# Patient Record
(demographics unavailable — no encounter records)

---

## 2024-12-02 NOTE — PHYSICAL EXAM
[General Appearance - Well Developed] : well developed [Normal Appearance] : normal appearance [Well Groomed] : well groomed [General Appearance - Well Nourished] : well nourished [No Deformities] : no deformities [General Appearance - In No Acute Distress] : no acute distress [Normal Conjunctiva] : the conjunctiva exhibited no abnormalities [Eyelids - No Xanthelasma] : the eyelids demonstrated no xanthelasmas [Respiration, Rhythm And Depth] : normal respiratory rhythm and effort [Exaggerated Use Of Accessory Muscles For Inspiration] : no accessory muscle use [Auscultation Breath Sounds / Voice Sounds] : lungs were clear to auscultation bilaterally [Heart Rate And Rhythm] : heart rate and rhythm were normal [Heart Sounds] : normal S1 and S2 [Murmurs] : no murmurs present [Abdomen Soft] : soft [Abdomen Tenderness] : non-tender [Abdomen Mass (___ Cm)] : no abdominal mass palpated [Abnormal Walk] : normal gait [Gait - Sufficient For Exercise Testing] : the gait was sufficient for exercise testing [Nail Clubbing] : no clubbing of the fingernails [Cyanosis, Localized] : no localized cyanosis [Petechial Hemorrhages (___cm)] : no petechial hemorrhages [Skin Color & Pigmentation] : normal skin color and pigmentation [] : no rash [No Venous Stasis] : no venous stasis [Skin Lesions] : no skin lesions [No Skin Ulcers] : no skin ulcer [No Xanthoma] : no  xanthoma was observed [Oriented To Time, Place, And Person] : oriented to person, place, and time [Affect] : the affect was normal [Mood] : the mood was normal [No Anxiety] : not feeling anxious [FreeTextEntry1] : mild  LE foot edema

## 2024-12-02 NOTE — HISTORY OF PRESENT ILLNESS
[FreeTextEntry1] : PCP- Dr EZRA Gonzalez\par  Pulm- Dr CASH Colvin\par  Endo- L Poretsky\par  Renal-  Dr Avalos\par  Pain- @ [place]

## 2024-12-02 NOTE — REASON FOR VISIT
[FreeTextEntry1] : 85 yr old male with COPD, GERD, HTN, DM, CKD and hypothyroidism. Presents today for Cardiology  consult.  Review of his records reveals the presence of a thoracic aneurysm documented at 45 mm in 2013 and 48 mm in 2017.  These were C- CTs.  Pt denies any c/o.  8/10/20 Bothered by neck pain x 5 days and then left shoulder. No CP,better if he moves it but it comes back. Feels fine with walking.  4/1/21- Hearing loss. BP OK, walks with his wife daily 12/14/21  Muscle ache right calf with walking, pulses excellent.  ? statin induced.   ESCOTO, will get CCS which will give a CCS, lung view and aortic size. 9/15/22 Had Shingles L trigeminal  distribution  On Gabapentin and Tramadol.  Aorta assess early 2022 by CT 3/14/23  In [facility] 2/28-3/7/23 with sepsis, Empyema and A flutter. In [facility] 2/24-2/26/23 with PNA.  Pleural effusion grew Pneumococcus 4/25/23 no complaints today. checks BP at home -BPs up to 160 systolic. having lot of leg pain with crestor, stopped taking.   6/22/23  Off Hydralazine, BP excellent. 11/29/23  Feels weak since long hosp stay last year 6/3/24  A1c 6.7, , , HDL 37,  EKG: NSR with 1st degree AVB and a RBBB. No ST-Tw abnormalities. 7/15/20, 4/1/21, 3/14/23 12/2/24: Wife says his memory never recovered from the sepsis.  c/o fatigue/weakness.

## 2024-12-02 NOTE — ASSESSMENT
[FreeTextEntry1] : 4/25/23- BP elevated at home and in office. start hydralazine 10mg TID. f/u in 1 mo.  BP better 12/2 . Now on Felodipine and HCTZ.  /60.  HLD -4/25/23 -needs to restart statin -will try every other dosing at next appointment . He refuses all options including Pravastatin.  Not clear if he is taking Pravastatin, last LDL too high at 146, will consider Repatha. Spoke to wife, pt not taking statin.  Will resume and stress compliance.  A Flutter- On Eliquis 2.5 BID.  Edema- Increased since hospital stay. Change HCTZ to Lasix 20 mg. Still on HCTZ. Edema better. 12/2 - no edema.  RBBB and 1st degree AVB- will watch for worsening conduction abnormalities. New since Feb 2020. Needs Nuc to r/o CAD. WNL 2020  Dilated Ao root- Hx of a thoracic aneurysm documented at 45 mm in 2013 and 48 mm in 2017. These were C- CTs. On echo Aug 2020 aortic root width is 44 mm which is most likely underestimating it. Last CT 2017, reordered. 42-45 mm in 2022. 50 mm on CT Feb 2023 in [facility].  Referred to Dr Guerra.  Sinus Bradycardia- minimum HR of 31 noted on ZIO monitor. Pt is not on any sinus slowing meds, however Clonidine can slow HR. DC Clonidine 3/2023, already stopped in hospital.  HR better.  ESCOTO- CCS ordered but not done.

## 2025-01-28 NOTE — PHYSICAL EXAM
[Alert] : alert [Well Nourished] : well nourished [Healthy Appearance] : healthy appearance [No Acute Distress] : no acute distress [Normal Sclera/Conjunctiva] : normal sclera/conjunctiva [No Proptosis] : no proptosis [No Respiratory Distress] : no respiratory distress [No Accessory Muscle Use] : no accessory muscle use [Normal Rate and Effort] : normal respiratory rate and effort [Normal Rate] : heart rate was normal [No Edema] : no peripheral edema [Spine Straight] : spine straight [No Stigmata of Cushings Syndrome] : no stigmata of Cushings Syndrome [Normal Gait] : normal gait [No Clubbing, Cyanosis] : no clubbing  or cyanosis of the fingernails [No Involuntary Movements] : no involuntary movements were seen [No Joint Swelling] : no joint swelling seen [No Rash] : no rash [No Tremors] : no tremors [Oriented x3] : oriented to person, place, and time [Acanthosis Nigricans] : no acanthosis nigricans [Foot Ulcers] : no foot ulcers [de-identified] : poor hearing

## 2025-01-28 NOTE — ASSESSMENT
[Diabetes Foot Care] : diabetes foot care [Carbohydrate Consistent Diet] : carbohydrate consistent diet [Self Monitoring of Blood Glucose] : self monitoring of blood glucose [Retinopathy Screening] : Patient was referred to ophthalmology for retinopathy screening [FreeTextEntry1] : Diabetes - well controlled, continue the same Tx. Questions about GLP-1 answered. Hypothyroidism - will f/u once labs are available. Refills sent. Referred to ophthalmologist. F/U with Dr. Salamanca in 4-6 months.

## 2025-01-28 NOTE — HISTORY OF PRESENT ILLNESS
[FreeTextEntry1] : Pt of Dr. Salamanca with DM and hypothyroidism came for f/u. Feels well. Current Tx for DM - Mounjaro 2.5; keeps BG under control Whwn checks at home - FS -  in 130-s; POC BG - 113 mg/dl POC A1C - 6.5%. Lost ~ 5 lbs and is happy with his current weight. Recent labs from Dr. Barron reviewed and discussed; I called the lab, asking to add TFTs, if not possible - will order via [facility].  Ophtho and podiatry - due.

## 2025-01-31 NOTE — PHYSICAL EXAM
[General Appearance - Alert] : alert [General Appearance - In No Acute Distress] : in no acute distress [] : no respiratory distress [Heart Rate And Rhythm] : heart rate was normal and rhythm regular [Auscultation Breath Sounds / Voice Sounds] : lungs were clear to auscultation bilaterally [Heart Sounds] : normal S1 and S2 [Heart Sounds Gallop] : no gallops [Murmurs] : no murmurs [Heart Sounds Pericardial Friction Rub] : no pericardial rub [Oriented To Time, Place, And Person] : oriented to person, place, and time [Impaired Insight] : insight and judgment were intact [Affect] : the affect was normal [FreeTextEntry1] : b/l trace pedeal edema

## 2025-01-31 NOTE — PHYSICAL EXAM
[General Appearance - Alert] : alert [General Appearance - In No Acute Distress] : in no acute distress [] : no respiratory distress [Auscultation Breath Sounds / Voice Sounds] : lungs were clear to auscultation bilaterally [Heart Rate And Rhythm] : heart rate was normal and rhythm regular [Heart Sounds] : normal S1 and S2 [Heart Sounds Gallop] : no gallops [Murmurs] : no murmurs [Heart Sounds Pericardial Friction Rub] : no pericardial rub [Oriented To Time, Place, And Person] : oriented to person, place, and time [Impaired Insight] : insight and judgment were intact [Affect] : the affect was normal [FreeTextEntry1] : b/l trace pedeal edema

## 2025-01-31 NOTE — HISTORY OF PRESENT ILLNESS
[FreeTextEntry1] : 86 yo man with CKD 4, proteinuria, DM, and HTN, for follow up evaluation no new medical events medications same energy appetite okay Fells well overall No NSAID  use. No HA, CP, SOB, dizziness. No flank pain, dysuria, hematuria, frothy urine.

## 2025-01-31 NOTE — ASSESSMENT
[FreeTextEntry1] : all lab data was reviewed with patient in detail from  1/22/2025 and 1/25/2025 86 yo man with CKD 4, hyperkalemia, HTN, DM, proteinuria -CKD 4: clinically stable- BP, volume status good creat stable range- 2.94  maintains low protein diet -HTN: BP at goal- c/w present regimen   -DM: A1C acceptable 6.7% -metabolic acidosis- CO2- 17- if stays < 20 will add bicarb -Proteinuria: Ualb/creat down again to 805 from 996 defer RASi as pt with allergy to CEI, pt reluctant to start any other medications not sure if addition of [id number] will help his outcome given advanced age and advanced CKD -hyperlipidemia- LP improving- c/w same  f/u 4 months

## 2025-03-01 NOTE — HISTORY OF PRESENT ILLNESS
[Former] : former [>= 20 pack years] : >= 20 pack years [Never] : never [TextBox_4] :  87 yom former smoker with history of empyema in 2023, COPD (only Advair 2 puffs/day), thoracic aneurysm, HTN, DM, hypothyroidism and hyperlipidemia. Former patient of Dr. Colvin.  Diagnosed with empyema in March 2023,  s/p chest tube and MIST2 protocol  (hospitalized [facility] 02/27-03/07), recovered. Right bronchogenic cyst known for years(?), appreciated on CT.  Pt reports no cough, able to bring out clear phlegm. Wife reports he is able to ambulate 8 blocks every day to diner, no stairs. Appetite has not changed, main complaint is fatigue. Otherwise in the same state of health. Pt denies any recent sickness, chest pain, SOB, nausea, vomiting, dizziness. and recent hospitalization. He takes advair, 2puffs BID reports compliance with inhaler. Past PFT: 1/31/2022: FEV1/FVC:68, FEV1:74%, DLCO:60%  Social hx: retired  Covid vaccine: bivalent booster 2023 RSV vaccine: 09/23  Flu vaccine: 2023 04/04/24 Pt is a follow up after PFT accompanied by wife. Pt remains in the same state of health. Still ambulates 8 blocks daily to the diner. Reports occasional coughs out white sputum. Pt denies weight loss, chest pain, SOB, nausea, and vomiting .  PFT: 01/11/24:  FEV1/FVC:60%<-- 68% FEV1:83%(1.85L)<-- 74%(2.01L) Unable to do lung volumes DLCO:61% (12.56 ml/min/mmHg)<--60%(14.69ml/min/mmHg)  02/27/25 Follow yearly visit. Wife reports patient has been same as previous visits. Ambulating 8 blocks every day to the dinner. No cough, occasional clear phlegm. Appetite is good.. Doing Advair 1 puff BID and rinses mouth. Insurance stopped paying for Advair. Requesting for prior auth and alternatives inhalers (Symbicort, Breo, Dulera) was suggested. Denies any recent hospitalization and urgent care visits.  [TextBox_13] : 20 [TextBox_11] : 2 [YearQuit] : 1978

## 2025-03-01 NOTE — PHYSICAL EXAM
[No Acute Distress] : no acute distress [Normal Oropharynx] : normal oropharynx [Normal Appearance] : normal appearance [Normal Rate/Rhythm] : normal rate/rhythm [No Resp Distress] : no resp distress [No Abnormalities] : no abnormalities [Benign] : benign [Normal Gait] : normal gait [No Clubbing] : no clubbing [Normal Color/ Pigmentation] : normal color/ pigmentation [No Focal Deficits] : no focal deficits [Oriented x3] : oriented x3 [Well Groomed] : well groomed [Supple] : supple [Normal S1, S2] : normal s1, s2 [Clear to Auscultation Bilaterally] : clear to auscultation bilaterally [No Cyanosis] : no cyanosis [No Edema] : no edema

## 2025-03-01 NOTE — ASSESSMENT
[FreeTextEntry1] :  86 yom with history of  COPD (advair 2 puffs/day), thoracic aneurysm, HTN, DM, hypothyroidism and hyperlipidemia.  PFT 01/2022: FEV1: 74%, FEV1: 61%, DLCO: 60% PFT: 01/11/24: FEV1/FVC:60%, FEV1:83%, DLCO:61%-mildly expiratory obstruction, moderately decreased DLCO.  Essentially unchanged. He had difficulty preforming test. Pt does not want to redo the test.   Stable COPD with no recent exacerbation. Patient had hard time completing pft last time, he does not want to get tested again. Clinically stable, no new symptoms. Will continue Advair. Prior authorization pending. If not covered, will try Symbicort.   Bronchogenic cyst: Wife is aware of it's presence. Findings discussed during last visit, patient and his wife are not interested in monitoring.  Will reach out to PCP Dr Gonzalez for PCV20 immunization record. If eligible, advised to get PCV 20 with PCP or pulmonary  O/V yearly or sooner if needed.

## 2025-03-01 NOTE — HISTORY OF PRESENT ILLNESS
[Former] : former [>= 20 pack years] : >= 20 pack years [Never] : never [TextBox_4] :  87 yom former smoker with history of empyema in 2023, COPD (only Advair 2 puffs/day), thoracic aneurysm, HTN, DM, hypothyroidism and hyperlipidemia. Former patient of Dr. Colvin.  Diagnosed with empyema in March 2023,  s/p chest tube and MIST2 protocol  (hospitalized [facility] 02/27-03/07), recovered. Right bronchogenic cyst known for years(?), appreciated on CT.  Pt reports no cough, able to bring out clear phlegm. Wife reports he is able to ambulate 8 blocks every day to diner, no stairs. Appetite has not changed, main complaint is fatigue. Otherwise in the same state of health. Pt denies any recent sickness, chest pain, SOB, nausea, vomiting, dizziness. and recent hospitalization. He takes advair, 2puffs BID reports compliance with inhaler. Past PFT: 1/31/2022: FEV1/FVC:68, FEV1:74%, DLCO:60%  Social hx: retired  Covid vaccine: bivalent booster 2023 RSV vaccine: 09/23  Flu vaccine: 2023 04/04/24 Pt is a follow up after PFT accompanied by wife. Pt remains in the same state of health. Still ambulates 8 blocks daily to the diner. Reports occasional coughs out white sputum. Pt denies weight loss, chest pain, SOB, nausea, and vomiting .  PFT: 01/11/24:  FEV1/FVC:60%<-- 68% FEV1:83%(1.85L)<-- 74%(2.01L) Unable to do lung volumes DLCO:61% (12.56 ml/min/mmHg)<--60%(14.69ml/min/mmHg)  02/27/25 Follow yearly visit. Wife reports patient has been same as previous visits. Ambulating 8 blocks every day to the dinner. No cough, occasional clear phlegm. Appetite is good.. Doing Advair 1 puff BID and rinses mouth. Insurance stopped paying for Advair. Requesting for prior auth and alternatives inhalers (Symbicort, Breo, Dulera) was suggested. Denies any recent hospitalization and urgent care visits.  [TextBox_11] : 2 [TextBox_13] : 20 [YearQuit] : 1978

## 2025-03-01 NOTE — HISTORY OF PRESENT ILLNESS
[Former] : former [>= 20 pack years] : >= 20 pack years [Never] : never [TextBox_4] :  87 yom former smoker with history of empyema in 2023, COPD (only Advair 2 puffs/day), thoracic aneurysm, HTN, DM, hypothyroidism and hyperlipidemia. Former patient of Dr. Colvin.  Diagnosed with empyema in March 2023,  s/p chest tube and MIST2 protocol  (hospitalized [facility] 02/27-03/07), recovered. Right bronchogenic cyst known for years(?), appreciated on CT.  Pt reports no cough, able to bring out clear phlegm. Wife reports he is able to ambulate 8 blocks every day to diner, no stairs. Appetite has not changed, main complaint is fatigue. Otherwise in the same state of health. Pt denies any recent sickness, chest pain, SOB, nausea, vomiting, dizziness. and recent hospitalization. He takes advair, 2puffs BID reports compliance with inhaler. Past PFT: 1/31/2022: FEV1/FVC:68, FEV1:74%, DLCO:60%  Social hx: retired  Covid vaccine: bivalent booster 2023 RSV vaccine: 09/23  Flu vaccine: 2023 04/04/24 Pt is a follow up after PFT accompanied by wife. Pt remains in the same state of health. Still ambulates 8 blocks daily to the diner. Reports occasional coughs out white sputum. Pt denies weight loss, chest pain, SOB, nausea, and vomiting .  PFT: 01/11/24:  FEV1/FVC:60%<-- 68% FEV1:83%(1.85L)<-- 74%(2.01L) Unable to do lung volumes DLCO:61% (12.56 ml/min/mmHg)<--60%(14.69ml/min/mmHg)  02/27/25 Follow yearly visit. Wife reports patient has been same as previous visits. Ambulating 8 blocks every day to the dinner. No cough, occasional clear phlegm. Appetite is good.. Doing Advair 1 puff BID and rinses mouth. Insurance stopped paying for Advair. Requesting for prior auth and alternatives inhalers (Symbicort, Breo, Dulera) was suggested. Denies any recent hospitalization and urgent care visits.  [TextBox_13] : 20 [TextBox_11] : 2 [YearQuit] : 1978

## 2025-03-01 NOTE — REVIEW OF SYSTEMS
[Poor Appetite] : poor appetite [Sputum] : sputum [SOB on Exertion] : sob on exertion [Negative] : Endocrine [Sinus Problems] : no sinus problems [Cough] : no cough [TextBox_30] : occasional

## 2025-03-01 NOTE — END OF VISIT
[FreeTextEntry3] : NP participated on patient's encounter.NP participated on patient's encounter.  I, personally performed the evaluation and management (E/M) services for this established patient who presents today with (a) new problem(s)/exacerbation of (an) existing condition(s).  That E/M includes conducting the examination, assessing all new/exacerbated conditions, and establishing a new plan of care.  Today, my ACP was here to observe and participate on evaluation and management services for this new problem/exacerbated condition to be followed going forward. [Time Spent: ___ minutes] : I have spent [unfilled] minutes of time on the encounter which excludes teaching and separately reported services.